# Patient Record
Sex: FEMALE | Race: WHITE | NOT HISPANIC OR LATINO | URBAN - NONMETROPOLITAN AREA
[De-identification: names, ages, dates, MRNs, and addresses within clinical notes are randomized per-mention and may not be internally consistent; named-entity substitution may affect disease eponyms.]

---

## 2017-03-14 NOTE — PATIENT DISCUSSION
NGOZI EXACERBATED BY MGD, OU: PRESCRIBE WARM COMPRESSES AND EYELID SCRUBS QD-BID, ARTIFICIAL TEARS BID-QID, THE DAILY INTAKE OF OMEGA-3 FATTY ACIDS. WILL CONSIDER LIPIFLOW TREATMENT NEXT VISIT IF NOT RESPONSIVE TO TREATMENT OR IF SYMPTOMS PERSIST. RETURN FOR FOLLOW-UP AS SCHEDULED.

## 2017-03-14 NOTE — PATIENT DISCUSSION
Vitreomacular Traction (VMT) Counseling: The diagnosis and pathophysiology of vitreomacular traction was discussed with the patient.

## 2018-04-09 NOTE — PATIENT DISCUSSION
RETINA IS ATTACHED OU: NO VMT; NO PVD; NO HOLES OR TEARS SEEN ON DILATED EXAM TODAY.  RETINAL DETACHMENT SIGNS AND SYMPTOMS REVIEWED

## 2018-04-09 NOTE — PATIENT DISCUSSION
VITREOMACULAR TRACTION, OD: SPONTANEOUS RELEASE. NO TREATMENT INDICATED AT THIS TIME. RETURN AS SCHEDULED.

## 2018-04-10 NOTE — PATIENT DISCUSSION
MACULAR DRUSEN, ou:  PRESCRIBE AREDS 2 VITAMINS AND INCREASE UV PROTECTION. RETURN FOR FOLLOW-UP AS SCHEDULED.

## 2020-06-12 ENCOUNTER — IMPORTED ENCOUNTER (OUTPATIENT)
Dept: URBAN - NONMETROPOLITAN AREA CLINIC 1 | Facility: CLINIC | Age: 80
End: 2020-06-12

## 2020-06-12 PROBLEM — H25.813: Noted: 2020-06-12

## 2020-06-12 PROBLEM — E11.9: Noted: 2020-06-12

## 2020-06-12 PROBLEM — H16.223: Noted: 2020-06-12

## 2020-06-12 PROBLEM — H52.4: Noted: 2020-06-12

## 2020-06-12 PROBLEM — H35.3131: Noted: 2020-06-12

## 2020-06-12 PROCEDURE — 92015 DETERMINE REFRACTIVE STATE: CPT

## 2020-06-12 PROCEDURE — 92002 INTRM OPH EXAM NEW PATIENT: CPT

## 2020-06-12 NOTE — PATIENT DISCUSSION
NIDDM - Discussed diagnosis in detail with patient- Stressed importance of good blood sugar control- Recommend no soda’s- Patient reports last A1C is unknown and last blood sugar were stable - No diabetic retinopathy seen on today's exam- Letter to - Continue to Freddie OU- Discussed diagnosis in detail with patient- Discussed signs and symptoms of progression- Discussed UV protection- BAT done today OD 20/200 and OS 20/100- Recommend catmelissa limon with Dr. Chris Gaston. Patient states that she would like to think about it and let us know when shes ready - Continue to monitorARMD OU - Discussed diagnosis in detail with patient- Mother had ARMD - Recommend  patient start eye vitamins daily such as Preservision. Sample given 6/12/20- Recommend that patient start following the [address] patient to call or come into the office ASAP if any changes noted from today.  Grid given 6/12/20 with instructions on how to use by Dr. Marva Antunez- Continue to monitorDES OU- Discussed diagnosis in detail with patient- Discussed signs and symptoms of progression- Recommend patient drinking plenty of water and starting Omega 3’s - Recommend Refresh or Systane  throughout the day- Continue to monitorPresbyopia OU - Discussed diagnosis in detail with patient- New glasses RX given but do not recommend updating due to cataracts- Continue to monitor

## 2021-05-19 NOTE — PATIENT DISCUSSION
COUNSELING:
Continue: Fish Oil (docosahexanoic acid-epa): capsule: 120-180 mg
Dry Eye Syndrome Counseling: I have discussed the diagnosis and the pathophysiology of this disease with the patient. Eyelid pathology and systemic illnesses such as Sjogren?s disease or rheumatoid arthritis may contribute to severity. Vision may be limited by dry eye, and symptoms exacerbated by environmental factors such as smoke, wind, or prolonged eye use. Lifestyle habits and environmental factors contributing to dry eyes have been reviewed with the patient. Daily prescribed and over the counter medications, along with their potential contributions to dry eye symptoms, have been discussed. Treatment options include, but are not limited to, artificial tears, punctal plugs, topical cyclosporine, oral omega-3 supplements, Lipiflow, moisture goggles, and lubricating ointments. I stressed the importance of compliance with treatment.
ECTROPION, OU:  VISUALLY SIGNIFICANT TO THE PATIENT. RECOMMEND ARTIFICIAL TEARS OR OR LUBRICATING OINTMENT PRN. REFER TO [facility] IF SYMPTOMS WORSEN.
EXPOSURE K SICCA  ADD NIGHTLY LUBRICATING OINTMENT OR GEL. WILL CONSIDER RESTASIS OR PUNCTAL PLUGS AND/OR LIPIFLOW TREATMENT NEXT VISIT IF NOT RESPONSIVE OR IF SYMPTOMS PERSIST. RETURN FOR FOLLOW-UP AS SCHEDULED OR SOONER IF SYMPTOMS WORSEN.
Ectropion Counseling: The nature of the diagnosis and associated symptoms were discussed. The patient understands that there is a risk of corneal exposure, redness, dryness and even scarring from chronic exposure. Artificial tears, lubricating gel or ointment are recommended for the protection of the ocular surface.   Return for follow-up as scheduled or sooner if symptoms worsen
General:
HYPERTENSIVE RETINOPATHY OU:  IMPORTANCE OF GOOD BLOOD PRESSURE CONTROL STRESSED. KEEP FU WITH PCP AND AS SCHEDULED.
Hypertensive Retinopathy Counseling: The pathophysiology of hypertensive retinopathy and associated comorbidity was discussed with the patient. The importance of compliance with medications, and good blood pressure control was emphasized to limit risk of retinal ischemia and loss of vision. The patient should return for follow up immediately if any change of vision.
MACULAR DRUSEN, ou: PRESCRIBE AREDS 2 VITAMINS AND INCREASE UV PROTECTION. RETURN FOR FOLLOW-UP AS SCHEDULED.
Macular Drusen Counseling:   I have explained the diagnosis of macular drusen and the role of drusen in development of macular degeneration. AREDS 2 multivitamin may have some benefit, as well as UV protection when outdoors and a nutritious diet, especially green leafy vegetables and fish to minimize the risk of developing macular degeneration. The patient was advised of the importance of annual dilated eye exams. Return for follow-up as scheduled.
Medications:
NGOZI EXACERBATED BY MGD, OU: PRESCRIBE WARM COMPRESSES AND EYELID SCRUBS QD-BID, ARTIFICIAL TEARS BID-QID, THE DAILY INTAKE OF OMEGA-3 FATTY ACIDS. WILL CONSIDER LIPIFLOW TREATMENT NEXT VISIT IF NOT RESPONSIVE TO TREATMENT OR IF SYMPTOMS PERSIST. RETURN FOR FOLLOW-UP AS SCHEDULED.
Yes

## 2021-06-23 NOTE — PATIENT DISCUSSION
ECTROPION, OU: VISUALLY SIGNIFICANT TO THE PATIENT. RECOMMEND ARTIFICIAL TEARS OR OR LUBRICATING OINTMENT PRN. REFER TO [facility] IF SYMPTOMS WORSEN.

## 2022-03-19 PROBLEM — I16.0 HYPERTENSIVE URGENCY: Status: ACTIVE | Noted: 2021-02-02

## 2022-04-10 ASSESSMENT — VISUAL ACUITY
OS_GLARE: 20/100
OS_SC: 20/32-
OD_PH: 20/32-
OD_SC: 20/40-
OD_GLARE: 20/200

## 2022-04-10 ASSESSMENT — TONOMETRY
OS_IOP_MMHG: 20
OD_IOP_MMHG: 20

## 2022-07-14 NOTE — PATIENT DISCUSSION
Educated patient on findings and conditions. Prescribe PF artificial tears 4-6x/day OU, warm compresses QD/prn OU, and eyelid scrubs QD/prn OU. Discussed good visual hygiene including conscious blinking. Advised to call/RTC if si/sx persist or worsen. Monitor.

## 2022-07-14 NOTE — PATIENT DISCUSSION
Educated patient on findings and condition. Prescribe PF artificial tears 4-6x/day. UV protection. Monitor.

## 2023-02-01 RX ORDER — IRBESARTAN 75 MG/1
75 TABLET ORAL
COMMUNITY

## 2023-02-01 RX ORDER — FLUTICASONE FUROATE AND VILANTEROL 200; 25 UG/1; UG/1
1 POWDER RESPIRATORY (INHALATION) DAILY
COMMUNITY

## 2023-02-01 RX ORDER — ASPIRIN 81 MG/1
81 TABLET ORAL DAILY
COMMUNITY

## 2023-02-01 RX ORDER — ALBUTEROL SULFATE 90 UG/1
1 AEROSOL, METERED RESPIRATORY (INHALATION) PRN
COMMUNITY